# Patient Record
(demographics unavailable — no encounter records)

---

## 2025-04-30 NOTE — HISTORY OF PRESENT ILLNESS
[FreeTextEntry1] : Hospital Course: Discharge Date 23-Apr-2025 Admission Date 13-Apr-2025 01:02 Reason for Admission Subarachnoid hemorrhage  Hospital Course  64F with a past medical history of hypertension, diabetes, presented with sudden onset worst headache of life while shopping with mild nausea which has improved. Denies numbness, tingling. CTH thin concerning for subarachnoid hemorrhage. No apparent lesion on CT angiogram. [id number]. Pt had a CTH on 4/13 read as subarachnoid hemorrhage noted in the basilar cisterns, extending into the sylvian fissures and along the inferior frontal lobes. No evidence of hydrocephalus at this time. Pt was admitted to the [facility]. CTA H/N read as No significant stenosis of the cervical carotid or vertebral arteries. No significant stenosis or occlusion of the major proximal branches of the [place] of [place]. She underwent a cerebral angiogram on 4/13/25 that was negative. MRI Brain and C-spine on 4/14 read as No definite evidence of subarachnoid hemorrhage. Degenerative changes. No jeffrey herniation of the nucleus pulposus. No abnormal intramedullary T2 increased signal. No abnormal intraparenchymal or leptomeningeal enhancement. While admitted pt had TCDs. She also had a repeat angiogram on 4/21/25 that was negative. LED were negative for DVT. On 4/22, pt was hypotensive requiring a fluid bolus.

## 2025-04-30 NOTE — HISTORY OF PRESENT ILLNESS
[FreeTextEntry1] : Hospital Course: Discharge Date 23-Apr-2025 Admission Date 13-Apr-2025 01:02 Reason for Admission Subarachnoid hemorrhage  Hospital Course  64F with a past medical history of hypertension, diabetes, presented with sudden onset worst headache of life while shopping with mild nausea which has improved. Denies numbness, tingling. CTH thin concerning for subarachnoid hemorrhage. No apparent lesion on CT angiogram. MF1HH2. Pt had a CTH on 4/13 read as subarachnoid hemorrhage noted in the basilar cisterns, extending into the sylvian fissures and along the inferior frontal lobes. No evidence of hydrocephalus at this time. Pt was admitted to the [facility]. CTA H/N read as No significant stenosis of the cervical carotid or vertebral arteries. No significant stenosis or occlusion of the major proximal branches of the [place] of [place]. She underwent a cerebral angiogram on 4/13/25 that was negative. MRI Brain and C-spine on 4/14 read as No definite evidence of subarachnoid hemorrhage. Degenerative changes. No jeffrey herniation of the nucleus pulposus. No abnormal intramedullary T2 increased signal. No abnormal intraparenchymal or leptomeningeal enhancement. While admitted pt had TCDs. She also had a repeat angiogram on 4/21/25 that was negative. LED were negative for DVT. On 4/22, pt was hypotensive requiring a fluid bolus.

## 2025-04-30 NOTE — DATA REVIEWED
[de-identified] : MRI Brain and C-spine on 4/14/25 [de-identified] : cerebral angiogram 4/13/25 and 4/21/25

## 2025-04-30 NOTE — REASON FOR VISIT
[Follow-Up: _____] : a [unfilled] follow-up visit [FreeTextEntry1] : Cerebral angiogram 4/21/25 - Impression: Diagnostic angiogram without evidence of aneurysm or vascular malformation responsible for the patient's subarachnoid hemorrhage.  Cerebral angiogram 4/13/25 - Impression: Diagnostic angiogram without evidence of aneurysm or vascular malformation responsible for the patient's subarachnoid hemorrhage.  MRI BRAIN AND C-SPINE 4/14/25  - 1. No definite evidence of subarachnoid hemorrhage. However, please note there is presence of artifact on T2-weighted imaging. 2. Degenerative changes, as described in detail above. No jeffrey herniation of the nucleus pulposus. No abnormal intramedullary T2 increased signal. 3. No abnormal intraparenchymal or leptomeningeal enhancement.  CT HEAD 4/13/25 - Unremarkable non contrast head CT.

## 2025-04-30 NOTE — DATA REVIEWED
[de-identified] : MRI Brain and C-spine on 4/14/25 [de-identified] : cerebral angiogram 4/13/25 and 4/21/25

## 2025-04-30 NOTE — ASSESSMENT
[FreeTextEntry1] : IMPRESSION: 64F with h/o HTN, DM, presented with sudden onset WHOL, found to have thin SAH with negative CTA. EVD was not required. Cerebral angiograms on 4/13/25 and 4/21/25 were negative for aneurysm.   Presents today for first hospital follow up. Overall doing well with no headaches. She complains of pain/stiffness in joints, lower back and hips. We discussed her hospital course and plan moving forward.     PLAN: - MRI brain w/wo, MRA head w/o in 1 month  - f/u after for review

## 2025-05-28 NOTE — DATA REVIEWED
[de-identified] : MRI brain w/wo IV contrast 5/22/25 [de-identified] : MRA head w/o contrast 5/22/25

## 2025-05-28 NOTE — ASSESSMENT
[FreeTextEntry1] : IMPRESSION: 64F PMH of HTN, DM, presented on 4/12/25 with sudden onset WHOL, found to have thin SAH with negative CTA. EVD was not required. Cerebral angiograms on 4/13/25 and 4/21/25 were negative for aneurysm.  On her follow up a little over 6 weeks, patient is doing overall well. Reports intermittent fatigue and "feeling slower" but improved since hospitalization. No other symptoms of motor, sensory, speech, or visual abnormalities.   Repeat brain MRI w/wo, MRA w/o 5/22/25 show no acute intracranial hemorrhage, acute ischemia, or abnormal intracranial enhancement. No aneurysm or other vascular abnormality/source of hemorrhage.  Again, we discussed her hospital course and condition/type of subarachnoid hemorrhage in great detail. Recommend at least two more follow up studies just to make sure there are no underlying vascular causes of the hemorrhage.     PLAN: CTA head and neck w/wo IV contrast when it has been 3 months - mid July 2025 Follow up after for review Return to work letter - patient will update us and let us know if she feels that she needs to go back out of work due to her symptoms Repeat MRI at 6 months
[FreeTextEntry1] : IMPRESSION: 64F PMH of HTN, DM, presented on 4/12/25 with sudden onset WHOL, found to have thin SAH with negative CTA. EVD was not required. Cerebral angiograms on 4/13/25 and 4/21/25 were negative for aneurysm.  On her follow up a little over 6 weeks, patient is doing overall well. Reports intermittent fatigue and "feeling slower" but improved since hospitalization. No other symptoms of motor, sensory, speech, or visual abnormalities.   Repeat brain MRI w/wo, MRA w/o 5/22/25 show no acute intracranial hemorrhage, acute ischemia, or abnormal intracranial enhancement. No aneurysm or other vascular abnormality/source of hemorrhage.  Again, we discussed her hospital course and condition/type of subarachnoid hemorrhage in great detail. Recommend at least two more follow up studies just to make sure there are no underlying vascular causes of the hemorrhage.     PLAN: CTA head and neck w/wo IV contrast when it has been 3 months - mid July 2025 Follow up after for review Return to work letter - patient will update us and let us know if she feels that she needs to go back out of work due to her symptoms Repeat MRI at 6 months
child(radha)

## 2025-05-28 NOTE — DATA REVIEWED
[de-identified] : MRI brain w/wo IV contrast 5/22/25 [de-identified] : MRA head w/o contrast 5/22/25

## 2025-05-28 NOTE — HISTORY OF PRESENT ILLNESS
[FreeTextEntry1] : LESLEY HERNÁNDEZ is a 64 year old female with a past medical history of hypertension and diabetes who presented with sudden onset headache and nausea. CT head revealed diffuse thin subarachnoid hemorrhage throughout the basilar cisterns and left>right sylvian fissure. Cerebral angiogram on 4/13/2025 showed no evidence of underlying vascular abnormality responsible for the hemorrhage. On 4/21/25, she underwent repeat cerebral angiogram without evidence of aneurysm or vascular malformation responsible for the patient's subarachnoid hemorrhage.  Today, patient presents for follow up to review results of brain MRI/MRA obtained on 5/22/25. She is doing overall well. States she has intermittent fatigue, which has improved since the hospitalization. Denies other symptoms of motor, sensory, speech, or visual abnormalities. 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

## 2025-05-28 NOTE — REASON FOR VISIT
[Follow-Up: _____] : a [unfilled] follow-up visit [FreeTextEntry1] : Reviewed MRI brain w/wo IV contrast and MRA head w/o contrast done 5/22/25 s/p Follow up cerebral angiogram 4/21/25 s/p Diagnostic cerebral angiogram 4/13/25